# Patient Record
Sex: MALE | Race: WHITE | NOT HISPANIC OR LATINO | Employment: FULL TIME | URBAN - METROPOLITAN AREA
[De-identification: names, ages, dates, MRNs, and addresses within clinical notes are randomized per-mention and may not be internally consistent; named-entity substitution may affect disease eponyms.]

---

## 2023-06-30 PROBLEM — N52.8 OTHER MALE ERECTILE DYSFUNCTION: Status: ACTIVE | Noted: 2023-06-30

## 2023-06-30 PROBLEM — I10 HYPERTENSIVE DISORDER: Status: ACTIVE | Noted: 2019-07-19

## 2023-07-05 ENCOUNTER — TELEPHONE (OUTPATIENT)
Dept: INTERNAL MEDICINE | Age: 58
End: 2023-07-05

## 2023-07-05 NOTE — TELEPHONE ENCOUNTER
Caller: Ren De La Cruz    Relationship: Self    Best call back number: [phone]    What is the best time to reach you: ANYTIME    Who are you requesting to speak with (clinical staff, provider,  specific staff member): OFFICE STAFF    Do you know the name of the person who called: PATIENT STATED IT COULD HAVE BEEN SCOT?    What was the call regarding: PATIENT STATED THAT HE MISSED A CALL FROM THE OFFICE ABOUT A MEDICATION. HUB SAW NO MESSAGE IN CHART. PATIENT REQUESTING A CALLBACK TO KNOW WHAT IT WAS REGARDING.

## 2023-07-14 ENCOUNTER — TELEPHONE (OUTPATIENT)
Dept: PEDIATRICS | Facility: OTHER | Age: 58
End: 2023-07-14

## 2023-07-14 NOTE — TELEPHONE ENCOUNTER
Caller: Ren De La Cruz    Relationship: Self    Best call back number:     What form or medical record are you requesting: MEDICAL CLEARANCE FORM    Who is requesting this form or medical record from you: [facility] [employer]    Additional notes:     PATIENT IS NEEDING SOMEONE TO SIGN A MEDICAL FORM SAYING THAT HE TAKES HIGH BLOOD PRESSURE MEDICATION BUT IS OK TO LEARN TO DIVE      PLEASE ADVISE

## 2023-07-24 ENCOUNTER — OFFICE VISIT (OUTPATIENT)
Dept: INTERNAL MEDICINE | Age: 58
End: 2023-07-24
Payer: COMMERCIAL

## 2023-07-24 VITALS
DIASTOLIC BLOOD PRESSURE: 84 MMHG | WEIGHT: 194 LBS | HEART RATE: 60 BPM | OXYGEN SATURATION: 99 % | HEIGHT: 70 IN | SYSTOLIC BLOOD PRESSURE: 130 MMHG | BODY MASS INDEX: 27.77 KG/M2 | TEMPERATURE: 98.3 F

## 2023-07-24 DIAGNOSIS — N52.8 OTHER MALE ERECTILE DYSFUNCTION: ICD-10-CM

## 2023-07-24 DIAGNOSIS — I10 PRIMARY HYPERTENSION: ICD-10-CM

## 2023-07-24 DIAGNOSIS — Z76.89 ENCOUNTER TO ESTABLISH CARE: Primary | ICD-10-CM

## 2023-07-24 DIAGNOSIS — Z11.59 ENCOUNTER FOR HEPATITIS C SCREENING TEST FOR LOW RISK PATIENT: ICD-10-CM

## 2023-07-24 PROCEDURE — 99214 OFFICE O/P EST MOD 30 MIN: CPT

## 2023-07-24 NOTE — PROGRESS NOTES
"    I N T E R N A L  M E D I C I N E  [facility] Mendiola    ENCOUNTER DATE:  07/24/2023    Ren De La Cruz / 57 y.o. / male      CHIEF COMPLAINT / REASON FOR OFFICE VISIT     Establish Care and Hypertension      ASSESSMENT & PLAN     Diagnoses and all orders for this visit:    1. Encounter to establish care (Primary)    2. Primary hypertension  Overview:  Continue olmesartan 20 mg daily.    Orders:  -     Comprehensive Metabolic Panel    3. Other male erectile dysfunction    4. Encounter for hepatitis C screening test for low risk patient  -     Hepatitis C Antibody         SUMMARY/DISCUSSION  Will update CMP at today's visit.    Paperwork completed for pt's scuba classes.      Next Appointment with me: 1/15/2024    Return in about 6 months (around 1/13/2024) for Annual physical.      VITAL SIGNS     Visit Vitals  /84   Pulse 60   Temp 98.3 °F (36.8 °C)   Ht 177.8 cm (70\")   Wt 88 kg (194 lb)   SpO2 99%   BMI 27.84 kg/m²             Wt Readings from Last 3 Encounters:   07/24/23 88 kg (194 lb)   06/30/23 89 kg (196 lb 3.2 oz)   03/26/18 90.7 kg (200 lb)     Body mass index is 27.84 kg/m².        MEDICATIONS AT THE TIME OF OFFICE VISIT     Current Outpatient Medications on File Prior to Visit   Medication Sig Dispense Refill    olmesartan (BENICAR) 20 MG tablet Take 1 tablet by mouth Daily. 90 tablet 2    tadalafil (Cialis) 5 MG tablet Take 1 tablet by mouth Daily As Needed for Erectile Dysfunction. 30 tablet 4    [DISCONTINUED] aspirin 325 MG tablet Take 1 tablet by mouth Daily.       No current facility-administered medications on file prior to visit.        HISTORY OF PRESENT ILLNESS     Former pt of [facility] Samuel.    No problems or concerns at today's visit.  He is requesting paperwork to be completed in anticipation of taking scuba classes.    HTN: Remains on olmesartan 20 mg daily.  BP at home averages 120s/70s.  He is actively working towards weight loss with diet and exercise.  No chest pain, " shortness of breath, palpitations.      ED: Symptoms well controlled with use of tadalafil PRN.    Cologuard in February 2023 was negative.  Next due in 2026.      Patient Care Team:  Hollie De APRN as PCP - General (Family Medicine)    REVIEW OF SYSTEMS     Review of Systems   Constitutional:  Negative for chills, fever and unexpected weight change.   Respiratory:  Negative for cough, chest tightness and shortness of breath.    Cardiovascular:  Negative for chest pain, palpitations and leg swelling.   Neurological:  Negative for dizziness, weakness, light-headedness and headaches.   Psychiatric/Behavioral:  The patient is not nervous/anxious.         PHYSICAL EXAMINATION     Physical Exam  Vitals reviewed.   Constitutional:       General: He is not in acute distress.     Appearance: Normal appearance. He is not ill-appearing, toxic-appearing or diaphoretic.   HENT:      Head: Normocephalic and atraumatic.   Cardiovascular:      Rate and Rhythm: Normal rate and regular rhythm.      Heart sounds: Normal heart sounds.   Pulmonary:      Effort: Pulmonary effort is normal.      Breath sounds: Normal breath sounds.   Neurological:      Mental Status: He is alert and oriented to person, place, and time. Mental status is at baseline.   Psychiatric:         Mood and Affect: Mood normal.         Behavior: Behavior normal.         Thought Content: Thought content normal.         Judgment: Judgment normal.         REVIEWED DATA     Labs:           Imaging:            Medical Tests:           Summary of old records / correspondence / consultant report:           Request outside records:

## 2023-07-25 LAB
ALBUMIN SERPL-MCNC: 5.1 G/DL (ref 3.5–5.2)
ALBUMIN/GLOB SERPL: 3.4 G/DL
ALP SERPL-CCNC: 74 U/L (ref 39–117)
ALT SERPL-CCNC: 35 U/L (ref 1–41)
AST SERPL-CCNC: 34 U/L (ref 1–40)
BILIRUB SERPL-MCNC: 1.1 MG/DL (ref 0–1.2)
BUN SERPL-MCNC: 15 MG/DL (ref 6–20)
BUN/CREAT SERPL: 13.2 (ref 7–25)
CALCIUM SERPL-MCNC: 9.7 MG/DL (ref 8.6–10.5)
CHLORIDE SERPL-SCNC: 99 MMOL/L (ref 98–107)
CO2 SERPL-SCNC: 29.5 MMOL/L (ref 22–29)
CREAT SERPL-MCNC: 1.14 MG/DL (ref 0.76–1.27)
EGFRCR SERPLBLD CKD-EPI 2021: 75.5 ML/MIN/1.73
GLOBULIN SER CALC-MCNC: 1.5 GM/DL
GLUCOSE SERPL-MCNC: 93 MG/DL (ref 65–99)
HCV IGG SERPL QL IA: NON REACTIVE
POTASSIUM SERPL-SCNC: 4.9 MMOL/L (ref 3.5–5.2)
PROT SERPL-MCNC: 6.6 G/DL (ref 6–8.5)
SODIUM SERPL-SCNC: 139 MMOL/L (ref 136–145)

## 2024-01-18 ENCOUNTER — OFFICE VISIT (OUTPATIENT)
Dept: INTERNAL MEDICINE | Age: 59
End: 2024-01-18
Payer: COMMERCIAL

## 2024-01-18 VITALS
HEIGHT: 70 IN | BODY MASS INDEX: 29.49 KG/M2 | TEMPERATURE: 97.6 F | OXYGEN SATURATION: 97 % | WEIGHT: 206 LBS | SYSTOLIC BLOOD PRESSURE: 120 MMHG | HEART RATE: 81 BPM | DIASTOLIC BLOOD PRESSURE: 88 MMHG

## 2024-01-18 DIAGNOSIS — H60.331 ACUTE SWIMMER'S EAR OF RIGHT SIDE: ICD-10-CM

## 2024-01-18 DIAGNOSIS — I10 PRIMARY HYPERTENSION: ICD-10-CM

## 2024-01-18 DIAGNOSIS — N52.9 ERECTILE DYSFUNCTION, UNSPECIFIED ERECTILE DYSFUNCTION TYPE: ICD-10-CM

## 2024-01-18 DIAGNOSIS — Z12.5 PROSTATE CANCER SCREENING: ICD-10-CM

## 2024-01-18 DIAGNOSIS — Z00.00 ANNUAL PHYSICAL EXAM: Primary | ICD-10-CM

## 2024-01-18 LAB
ALBUMIN SERPL-MCNC: 4.6 G/DL (ref 3.5–5.2)
ALBUMIN/GLOB SERPL: 2.1 G/DL
ALP SERPL-CCNC: 78 U/L (ref 39–117)
ALT SERPL-CCNC: 57 U/L (ref 1–41)
APPEARANCE UR: CLEAR
AST SERPL-CCNC: 47 U/L (ref 1–40)
BASOPHILS # BLD AUTO: 0.03 10*3/MM3 (ref 0–0.2)
BASOPHILS NFR BLD AUTO: 0.7 % (ref 0–1.5)
BILIRUB SERPL-MCNC: 0.9 MG/DL (ref 0–1.2)
BILIRUB UR QL STRIP: NEGATIVE
BUN SERPL-MCNC: 14 MG/DL (ref 6–20)
BUN/CREAT SERPL: 12.3 (ref 7–25)
CALCIUM SERPL-MCNC: 9.6 MG/DL (ref 8.6–10.5)
CHLORIDE SERPL-SCNC: 99 MMOL/L (ref 98–107)
CHOLEST SERPL-MCNC: 202 MG/DL (ref 0–200)
CHOLEST/HDLC SERPL: 3.48 {RATIO}
CO2 SERPL-SCNC: 27.3 MMOL/L (ref 22–29)
COLOR UR: YELLOW
CREAT SERPL-MCNC: 1.14 MG/DL (ref 0.76–1.27)
EGFRCR SERPLBLD CKD-EPI 2021: 75 ML/MIN/1.73
EOSINOPHIL # BLD AUTO: 0.1 10*3/MM3 (ref 0–0.4)
EOSINOPHIL NFR BLD AUTO: 2.4 % (ref 0.3–6.2)
ERYTHROCYTE [DISTWIDTH] IN BLOOD BY AUTOMATED COUNT: 13 % (ref 12.3–15.4)
GLOBULIN SER CALC-MCNC: 2.2 GM/DL
GLUCOSE SERPL-MCNC: 73 MG/DL (ref 65–99)
GLUCOSE UR QL STRIP: NEGATIVE
HBA1C MFR BLD: 5.1 % (ref 4.8–5.6)
HCT VFR BLD AUTO: 43.7 % (ref 37.5–51)
HDLC SERPL-MCNC: 58 MG/DL (ref 40–60)
HGB BLD-MCNC: 15.6 G/DL (ref 13–17.7)
HGB UR QL STRIP: NEGATIVE
IMM GRANULOCYTES # BLD AUTO: 0.01 10*3/MM3 (ref 0–0.05)
IMM GRANULOCYTES NFR BLD AUTO: 0.2 % (ref 0–0.5)
KETONES UR QL STRIP: NEGATIVE
LDLC SERPL CALC-MCNC: 130 MG/DL (ref 0–100)
LEUKOCYTE ESTERASE UR QL STRIP: NEGATIVE
LYMPHOCYTES # BLD AUTO: 0.93 10*3/MM3 (ref 0.7–3.1)
LYMPHOCYTES NFR BLD AUTO: 22.7 % (ref 19.6–45.3)
MCH RBC QN AUTO: 36.3 PG (ref 26.6–33)
MCHC RBC AUTO-ENTMCNC: 35.7 G/DL (ref 31.5–35.7)
MCV RBC AUTO: 101.6 FL (ref 79–97)
MONOCYTES # BLD AUTO: 0.59 10*3/MM3 (ref 0.1–0.9)
MONOCYTES NFR BLD AUTO: 14.4 % (ref 5–12)
NEUTROPHILS # BLD AUTO: 2.44 10*3/MM3 (ref 1.7–7)
NEUTROPHILS NFR BLD AUTO: 59.6 % (ref 42.7–76)
NITRITE UR QL STRIP: NEGATIVE
NRBC BLD AUTO-RTO: 0 /100 WBC (ref 0–0.2)
PH UR STRIP: 5.5 [PH] (ref 5–8)
PLATELET # BLD AUTO: 174 10*3/MM3 (ref 140–450)
POTASSIUM SERPL-SCNC: 4.6 MMOL/L (ref 3.5–5.2)
PROT SERPL-MCNC: 6.8 G/DL (ref 6–8.5)
PROT UR QL STRIP: NEGATIVE
PSA SERPL-MCNC: 1.34 NG/ML (ref 0–4)
RBC # BLD AUTO: 4.3 10*6/MM3 (ref 4.14–5.8)
SODIUM SERPL-SCNC: 137 MMOL/L (ref 136–145)
SP GR UR STRIP: 1.02 (ref 1–1.03)
T4 FREE SERPL-MCNC: 1.11 NG/DL (ref 0.93–1.7)
TRIGL SERPL-MCNC: 77 MG/DL (ref 0–150)
TSH SERPL DL<=0.005 MIU/L-ACNC: 2.8 UIU/ML (ref 0.27–4.2)
UROBILINOGEN UR STRIP-MCNC: NORMAL MG/DL
VLDLC SERPL CALC-MCNC: 14 MG/DL (ref 5–40)
WBC # BLD AUTO: 4.1 10*3/MM3 (ref 3.4–10.8)

## 2024-01-18 RX ORDER — CIPROFLOXACIN AND DEXAMETHASONE 3; 1 MG/ML; MG/ML
4 SUSPENSION/ DROPS AURICULAR (OTIC) 2 TIMES DAILY
Qty: 7.5 ML | Refills: 0 | Status: SHIPPED | OUTPATIENT
Start: 2024-01-18 | End: 2024-01-25

## 2024-01-18 RX ORDER — TADALAFIL 10 MG/1
10 TABLET ORAL DAILY PRN
Qty: 30 TABLET | Refills: 0 | Status: SHIPPED | OUTPATIENT
Start: 2024-01-18

## 2024-01-18 NOTE — PROGRESS NOTES
"    I N T E R N A L  M E D I C I N E  Hollie De, APRN      ENCOUNTER DATE:  01/18/2024    Ren Jeanman / 58 y.o. / male    CHIEF COMPLAINT     Annual Exam    Reports a couple week history of right ear pain without hearing impairment, ear drainage or fever, chills.  He used leftover swimmer's ear drops this past weekend with some improvement in symptoms.  Does participate in scuba dives.      HTN: Remains well controlled on olmesartan 20 mg daily.  BP at home averages 120s/70s.  Weight has increased by by 12 pounds since July 2023.  He has plans to refocus on diet/ exercise.  No chest pain, shortness of breath, palpitations.       ED: Symptoms well controlled with use of tadalafil 10 mg PRN.     Cologuard in February 2023 was negative.  Next due in 2026.    No prior PSA results in EPIC.      VITALS     Visit Vitals  /88   Pulse 81   Temp 97.6 °F (36.4 °C)   Ht 177.8 cm (70\")   Wt 93.4 kg (206 lb)   SpO2 97%   BMI 29.56 kg/m²       BP Readings from Last 3 Encounters:   01/18/24 120/88   07/24/23 130/84   06/30/23 118/72     Wt Readings from Last 3 Encounters:   01/18/24 93.4 kg (206 lb)   07/24/23 88 kg (194 lb)   06/30/23 89 kg (196 lb 3.2 oz)      Body mass index is 29.56 kg/m².      MEDICATIONS     Current Outpatient Medications on File Prior to Visit   Medication Sig Dispense Refill    olmesartan (BENICAR) 20 MG tablet Take 1 tablet by mouth Daily. 90 tablet 2    [DISCONTINUED] tadalafil (Cialis) 5 MG tablet Take 1 tablet by mouth Daily As Needed for Erectile Dysfunction. 30 tablet 4     No current facility-administered medications on file prior to visit.         HISTORY OF PRESENT ILLNESS      Ren presents for annual health maintenance visit.    General health: good  Lifestyle:  Attempting to lose weight?: Yes   Diet: eats moderately healthy  Exercise: Walking regularly  Tobacco: Never used   Alcohol: 2 days/week and 2 drinks/occasion  Work: Full-time  Reproductive health:  Sexually active?: " Yes   Concern for STD?: No   Sexual problems?: erectile dysfunction   Sees Urologist?: No   Depression Screenin/18/2024    10:40 AM   PHQ-2/PHQ-9 Depression Screening   Little Interest or Pleasure in Doing Things 0-->not at all   Feeling Down, Depressed or Hopeless 0-->not at all   PHQ-9: Brief Depression Severity Measure Score 0         PHQ-2: 0 (Not depressed)     PHQ-9: 0 (Negative screening for depression)    Patient Care Team:  Hollie De APRN as PCP - General (Family Medicine)  ______________________________________________________________________    ALLERGIES  Allergies   Allergen Reactions    Morphine And Related Nausea And Vomiting        PFSH:     The following portions of the patient's history were reviewed and updated as appropriate: Allergies / Current Medications / Past Medical History / Surgical History / Social History / Family History    PROBLEM LIST   Patient Active Problem List   Diagnosis    Hypertensive disorder    Other male erectile dysfunction       PAST MEDICAL HISTORY  Past Medical History:   Diagnosis Date    Hypertension        SURGICAL HISTORY  Past Surgical History:   Procedure Laterality Date    HIP SURGERY Bilateral        SOCIAL HISTORY  Social History     Socioeconomic History    Marital status: Unknown   Tobacco Use    Smoking status: Never    Smokeless tobacco: Never   Vaping Use    Vaping Use: Never used   Substance and Sexual Activity    Alcohol use: Yes     Comment: daily, 1-2 drinks    Drug use: Never    Sexual activity: Yes     Partners: Female       FAMILY HISTORY  Family History   Problem Relation Age of Onset    Cancer Mother         breast    Cancer Father         lung    Diabetes Father     Hypertension Father     Multiple sclerosis Sister        IMMUNIZATION HISTORY  Immunization History   Administered Date(s) Administered    COVID-19 (MODERNA) 1st,2nd,3rd Dose Monovalent 2021, 2021    COVID-19 (MODERNA) Monovalent Original Booster  12/08/2021    Fluzone (or Fluarix & Flulaval for VFC) >6mos 09/01/2019    Influenza Seasonal Injectable 10/01/2018    Influenza, Unspecified 10/01/2018, 10/01/2023    Shingrix 08/12/2020, 02/16/2021    Tdap 08/01/2019         REVIEW OF SYSTEMS     Review of Systems   Constitutional:  Negative for chills, fever and unexpected weight change.   HENT:  Positive for ear pain. Negative for hearing loss.    Respiratory:  Negative for cough, chest tightness and shortness of breath.    Cardiovascular:  Negative for chest pain, palpitations and leg swelling.   Neurological:  Negative for dizziness, weakness, light-headedness and headaches.   Psychiatric/Behavioral:  The patient is not nervous/anxious.        PHYSICAL EXAMINATION     Physical Exam  Vitals reviewed.   Constitutional:       General: He is not in acute distress.     Appearance: Normal appearance. He is not ill-appearing, toxic-appearing or diaphoretic.   HENT:      Head: Normocephalic and atraumatic.      Right Ear: Tympanic membrane and external ear normal. There is no impacted cerumen.      Left Ear: Tympanic membrane, ear canal and external ear normal. There is no impacted cerumen.      Ears:      Comments: Right ear canal erythematous     Nose: Nose normal. No congestion or rhinorrhea.      Mouth/Throat:      Mouth: Mucous membranes are moist.      Pharynx: Oropharynx is clear. No oropharyngeal exudate or posterior oropharyngeal erythema.   Eyes:      Extraocular Movements: Extraocular movements intact.      Conjunctiva/sclera: Conjunctivae normal.      Pupils: Pupils are equal, round, and reactive to light.   Cardiovascular:      Rate and Rhythm: Normal rate and regular rhythm.      Heart sounds: Normal heart sounds.   Pulmonary:      Effort: Pulmonary effort is normal. No respiratory distress.      Breath sounds: Normal breath sounds.   Abdominal:      General: Bowel sounds are normal.      Palpations: Abdomen is soft.      Tenderness: There is no abdominal  "tenderness.   Musculoskeletal:         General: Normal range of motion.      Cervical back: Normal range of motion and neck supple.      Right lower leg: No edema.      Left lower leg: No edema.   Lymphadenopathy:      Cervical: No cervical adenopathy.   Skin:     General: Skin is warm and dry.   Neurological:      General: No focal deficit present.      Mental Status: He is alert and oriented to person, place, and time. Mental status is at baseline.   Psychiatric:         Mood and Affect: Mood normal.         Behavior: Behavior normal.         Thought Content: Thought content normal.         Judgment: Judgment normal.         REVIEWED DATA      Labs:    Lab Results   Component Value Date     07/24/2023    K 4.9 07/24/2023    CALCIUM 9.7 07/24/2023    AST 34 07/24/2023    ALT 35 07/24/2023    BUN 15 07/24/2023    CREATININE 1.14 07/24/2023       Lab Results   Component Value Date    GLUCOSE 93 07/24/2023       No results found for: \"PSA\"    [unfilled]    No results found for: \"LDL\", \"HDL\", \"TRIG\", \"CHOLHDLRATIO\"    No components found for: \"TGEU126W\"    No results found for: \"WBC\", \"HGB\", \"MCV\", \"PLT\"    No results found for: \"PROTEIN\", \"GLUCOSEU\", \"BLOODU\", \"NITRITEU\", \"LEUKOCYTESUR\"       Lab Results   Component Value Date    HEPCVIRUSABY Non Reactive 07/24/2023       Imaging:           Medical Tests:           ASSESSMENT & PLAN     ANNUAL WELLNESS EXAM / PHYSICAL     Diagnoses and all orders for this visit:    1. Annual physical exam (Primary)  -     Hemoglobin A1c  -     Lipid Panel With / Chol / HDL Ratio    2. Acute swimmer's ear of right side  -     ciprofloxacin-dexAMETHasone (Ciprodex) 0.3-0.1 % otic suspension; Administer 4 drops to the right ear 2 (Two) Times a Day for 7 days.  Dispense: 7.5 mL; Refill: 0    3. Primary hypertension  Overview:  Continue olmesartan 20 mg daily.    Orders:  -     CBC & Differential  -     Comprehensive Metabolic Panel  -     TSH+Free T4  -     Urinalysis without " microscopic (no culture) - Urine, Clean Catch    4. Erectile dysfunction, unspecified erectile dysfunction type  -     tadalafil (Cialis) 10 MG tablet; Take 1 tablet by mouth Daily As Needed for Erectile Dysfunction.  Dispense: 30 tablet; Refill: 0    5. Prostate cancer screening  -     PSA Screen         Summary/Discussion:     Return for non improving ear symptoms.    BMI is >= 25 and <30. (Overweight) The following options were offered after discussion;: exercise counseling/recommendations and nutrition counseling/recommendations      Next Appointment with me: Visit date not found    Return for 6 month chronic care, 1 year annual physical.      HEALTHCARE MAINTENANCE ISSUES       Cancer Screening:  Colon: Initial/Next screening at age: -Cologuard, current  Repeat colon cancer screening: every 3 years  Prostate: PSA checked annually but no FRANCIE needed currently  Testicular: Recommended monthly self exam  Skin: Monthly self skin examination, annual exam by health professional  Lung: Does not meet criteria for lung cancer screening.   Other:    Screening Labs & Tests:  Lab results reviewed & discussed with with patient or orders placed today.  EKG:  CV Screening: Lipid panel  DEXA (75+ or risk factors):   HEP C (If born [birth year] or risk factors): Previously had negative screen  Other:     Immunization/Vaccinations (to be given today unless deferred by patient)  Influenza: Patient had the flu shot this season  Hepatitis A: Verify immunization records  Hepatitis B: Verify immunization records  Tetanus/Pertussis: Up to date  Pneumovax/PCV: Not needed at this time  Shingles: Up to date  COVID: Does not plan to get the latest booster  Lifestyle Counseling:  Lifestyle Modifications: Attempt to lose weight, Improve dietary compliance, Begin progressive aerobic exercise program 3-5 days a week, Maintain low sodium diet (< 3 gm) for blood pressure, Make dinner the lightest meal of day, Decrease or avoid alcohol intake, and  Reduce exposure to stress if possible  Safety Issues: Always wear seatbelt, Avoid texting while driving   Use sunscreen, regular skin examination  Recommended annual dental/vision examination.  Emotional/Stress/Sleep: Reviewed and  given when appropriate      Health Maintenance   Topic Date Due    BMI FOLLOWUP  Never done    COVID-19 Vaccine (4 - 2023-24 season) 01/20/2024 (Originally 9/1/2023)    ANNUAL PHYSICAL  01/18/2025    COLORECTAL CANCER SCREENING  02/06/2026    TDAP/TD VACCINES (2 - Td or Tdap) 08/01/2029    HEPATITIS C SCREENING  Completed    INFLUENZA VACCINE  Completed    ZOSTER VACCINE  Completed    Pneumococcal Vaccine 0-64  Aged Out

## 2024-01-19 DIAGNOSIS — D75.89 MACROCYTOSIS WITHOUT ANEMIA: Primary | ICD-10-CM

## 2024-02-06 RX ORDER — OLMESARTAN MEDOXOMIL 20 MG/1
20 TABLET ORAL DAILY
Qty: 90 TABLET | Refills: 1 | Status: SHIPPED | OUTPATIENT
Start: 2024-02-06

## 2024-02-23 DIAGNOSIS — H60.331 ACUTE SWIMMER'S EAR OF RIGHT SIDE: ICD-10-CM

## 2024-02-23 RX ORDER — CIPROFLOXACIN AND DEXAMETHASONE 3; 1 MG/ML; MG/ML
4 SUSPENSION/ DROPS AURICULAR (OTIC) 2 TIMES DAILY
Qty: 7.5 ML | Refills: 0 | OUTPATIENT
Start: 2024-02-23 | End: 2024-03-01

## 2024-02-23 NOTE — TELEPHONE ENCOUNTER
Caller: Ren De La Cruz    Relationship: Self    Best call back number:     [phone] (Home)       Requested Prescriptions:   Requested Prescriptions     Pending Prescriptions Disp Refills    ciprofloxacin-dexAMETHasone (Ciprodex) 0.3-0.1 % otic suspension 7.5 mL 0     Sig: Administer 4 drops to the right ear 2 (Two) Times a Day for 7 days.        Pharm[facility] where request should be sent[facility] [place] [facility] - [address] AT [address] TO [facility] - [phone]  - [phone] FX     Last office visit with prescribing clinician: 1/18/2024   Last telemedicine visit with prescribing clinician: Visit date not found   Next office visit with prescribing clinician: 7/18/2024     Additional details provided by patient: LESS THAN 3 DAYS     Does the patient have less than a 3 day supply:  [] Yes  [x] No    Would you like a call back once the refill request has been completed: [] Yes [x] No    If the office needs to give you a call back, can they leave a voicemail: [] Yes [x] No    Pantera Alvarez Rep   02/23/24 10:25 EST

## 2024-02-29 ENCOUNTER — OFFICE VISIT (OUTPATIENT)
Dept: INTERNAL MEDICINE | Age: 59
End: 2024-02-29
Payer: COMMERCIAL

## 2024-02-29 VITALS
HEIGHT: 70 IN | DIASTOLIC BLOOD PRESSURE: 82 MMHG | WEIGHT: 212 LBS | BODY MASS INDEX: 30.35 KG/M2 | OXYGEN SATURATION: 97 % | SYSTOLIC BLOOD PRESSURE: 130 MMHG | HEART RATE: 95 BPM | TEMPERATURE: 97.3 F

## 2024-02-29 DIAGNOSIS — H65.191 ACUTE EFFUSION OF RIGHT EAR: Primary | ICD-10-CM

## 2024-02-29 PROCEDURE — 99213 OFFICE O/P EST LOW 20 MIN: CPT

## 2024-02-29 RX ORDER — METHYLPREDNISOLONE 4 MG/1
TABLET ORAL
Qty: 21 TABLET | Refills: 0 | Status: SHIPPED | OUTPATIENT
Start: 2024-02-29

## 2024-02-29 RX ORDER — FLUTICASONE PROPIONATE 50 MCG
2 SPRAY, SUSPENSION (ML) NASAL DAILY
Qty: 16 G | Refills: 0 | Status: SHIPPED | OUTPATIENT
Start: 2024-02-29

## 2024-02-29 NOTE — PROGRESS NOTES
"    I N T E R N A L  M E D I C I N E  Hollie De, APRATILIO    ENCOUNTER DATE:  02/29/2024    Ren De La Cruz / 58 y.o. / male      CHIEF COMPLAINT / REASON FOR OFFICE VISIT     Earache (Right ears few weeks)      ASSESSMENT & PLAN     Diagnoses and all orders for this visit:    1. Acute effusion of right ear (Primary)  -     fluticasone (FLONASE) 50 MCG/ACT nasal spray; 2 sprays into the nostril(s) as directed by provider Daily.  Dispense: 16 g; Refill: 0  -     methylPREDNISolone (MEDROL) 4 MG dose pack; Take as directed on package instructions.  Dispense: 21 tablet; Refill: 0         SUMMARY/DISCUSSION  Treat symptomatic right ear effusion with medrol dose pack, Flonase nasal spray.  He is agreeable to provide an update on his condition next week through phone call.  If symptoms have not resolved, will place referral to ENT.      Next Appointment with me: 7/18/2024    Return for Next scheduled follow up.      VITAL SIGNS     Visit Vitals  /82   Pulse 95   Temp 97.3 °F (36.3 °C)   Ht 177.8 cm (70\")   Wt 96.2 kg (212 lb)   SpO2 97%   BMI 30.42 kg/m²             Wt Readings from Last 3 Encounters:   02/29/24 96.2 kg (212 lb)   01/18/24 93.4 kg (206 lb)   07/24/23 88 kg (194 lb)     Body mass index is 30.42 kg/m².        MEDICATIONS AT THE TIME OF OFFICE VISIT     Current Outpatient Medications on File Prior to Visit   Medication Sig Dispense Refill    olmesartan (BENICAR) 20 MG tablet Take 1 tablet by mouth Daily. 90 tablet 1    tadalafil (Cialis) 10 MG tablet Take 1 tablet by mouth Daily As Needed for Erectile Dysfunction. 30 tablet 0     No current facility-administered medications on file prior to visit.        HISTORY OF PRESENT ILLNESS     Last seen in our office on January 18, 2024.  At that time, he reported a couple week history of right ear pain without hearing impairment, ear drainage or fever, chills.  He used leftover swimmer's ear drops the prior weekend with some improvement in symptoms.  Does " "participate in scuba dives.      In the last couple weeks, he experienced recurrent right ear pressure/ discomfort, and again used leftover Ciprodex drops.  Symptoms have improved some but his right ear remains sensitive to loud sounds, and he continues to experience right ear discomfort described as \"throbbing.\"  Has not been engaging in any diving as of late.  No fever, chills, sinus congestion, pressure, ear drainage.      Patient Care Team:  Hollie De APRN as PCP - General (Family Medicine)    REVIEW OF SYSTEMS     Review of Systems   Constitutional:  Negative for chills, fever and unexpected weight change.   HENT:  Positive for ear pain (Right). Negative for congestion, hearing loss, sinus pressure and sinus pain.    Respiratory:  Negative for cough, chest tightness and shortness of breath.    Cardiovascular:  Negative for chest pain, palpitations and leg swelling.   Neurological:  Negative for dizziness, weakness, light-headedness and headaches.   Psychiatric/Behavioral:  The patient is not nervous/anxious.           PHYSICAL EXAMINATION     Physical Exam  Vitals reviewed.   Constitutional:       General: He is not in acute distress.     Appearance: Normal appearance. He is not ill-appearing, toxic-appearing or diaphoretic.   HENT:      Head: Normocephalic and atraumatic.      Right Ear: Tympanic membrane, ear canal and external ear normal. A middle ear effusion is present.      Left Ear: Tympanic membrane, ear canal and external ear normal. There is no impacted cerumen.      Nose:      Right Sinus: No maxillary sinus tenderness or frontal sinus tenderness.      Left Sinus: No maxillary sinus tenderness or frontal sinus tenderness.   Cardiovascular:      Rate and Rhythm: Normal rate and regular rhythm.      Heart sounds: Normal heart sounds.   Pulmonary:      Effort: Pulmonary effort is normal.      Breath sounds: Normal breath sounds.   Lymphadenopathy:      Cervical: No cervical adenopathy. "   Neurological:      Mental Status: He is alert and oriented to person, place, and time. Mental status is at baseline.   Psychiatric:         Mood and Affect: Mood normal.         Behavior: Behavior normal.         Thought Content: Thought content normal.         Judgment: Judgment normal.           REVIEWED DATA     Labs:           Imaging:            Medical Tests:           Summary of old records / correspondence / consultant report:           Request outside records:

## 2024-07-18 ENCOUNTER — OFFICE VISIT (OUTPATIENT)
Dept: INTERNAL MEDICINE | Age: 59
End: 2024-07-18
Payer: COMMERCIAL

## 2024-07-18 VITALS
RESPIRATION RATE: 16 BRPM | SYSTOLIC BLOOD PRESSURE: 145 MMHG | HEART RATE: 100 BPM | OXYGEN SATURATION: 96 % | WEIGHT: 208 LBS | BODY MASS INDEX: 29.78 KG/M2 | HEIGHT: 70 IN | TEMPERATURE: 98.2 F | DIASTOLIC BLOOD PRESSURE: 88 MMHG

## 2024-07-18 DIAGNOSIS — E78.00 PURE HYPERCHOLESTEROLEMIA: ICD-10-CM

## 2024-07-18 DIAGNOSIS — D75.89 MACROCYTOSIS WITHOUT ANEMIA: ICD-10-CM

## 2024-07-18 DIAGNOSIS — I10 PRIMARY HYPERTENSION: Primary | ICD-10-CM

## 2024-07-18 PROCEDURE — 99214 OFFICE O/P EST MOD 30 MIN: CPT

## 2024-07-18 NOTE — PROGRESS NOTES
"    I N T E R N A L  M E D I C I N E  Hollie De, APRN    ENCOUNTER DATE:  07/18/2024    Ren De La Cruz / 58 y.o. / male      CHIEF COMPLAINT / REASON FOR OFFICE VISIT     Hypertension      ASSESSMENT & PLAN     Diagnoses and all orders for this visit:    1. Primary hypertension (Primary)  Overview:  Continue olmesartan 20 mg daily.      2. Pure hypercholesterolemia    3. Macrocytosis without anemia         SUMMARY/DISCUSSION  Not fasting at today's visit, so will schedule fasting lab only appointment next week.  At that time, he will bring in record of daily BP readings at home.  BP is elevated at today's visit.  Discussed with patient that if BP continues to remain elevated > 130/80, recommend increasing olmesartan dosing from 20 mg to 40 mg daily with recheck of BMP in 1 month.  Encouraged low sodium diet, regular exercise, reduce stress.  For HLD, encouraged continued weight loss, low fat diet.    Suspect macrocytosis is related to alcohol intake.  Encouraged patient to decrease alcohol use.        Next Appointment with me: Visit date not found    No follow-ups on file.      VITAL SIGNS     Visit Vitals  /88   Pulse 100   Temp 98.2 °F (36.8 °C)   Resp 16   Ht 177.8 cm (70\")   Wt 94.3 kg (208 lb)   SpO2 96%   BMI 29.84 kg/m²             Wt Readings from Last 3 Encounters:   07/18/24 94.3 kg (208 lb)   02/29/24 96.2 kg (212 lb)   01/18/24 93.4 kg (206 lb)     Body mass index is 29.84 kg/m².        MEDICATIONS AT THE TIME OF OFFICE VISIT     Current Outpatient Medications on File Prior to Visit   Medication Sig Dispense Refill    olmesartan (BENICAR) 20 MG tablet Take 1 tablet by mouth Daily. 90 tablet 1    tadalafil (Cialis) 10 MG tablet Take 1 tablet by mouth Daily As Needed for Erectile Dysfunction. 30 tablet 0    [DISCONTINUED] fluticasone (FLONASE) 50 MCG/ACT nasal spray 2 sprays into the nostril(s) as directed by provider Daily. (Patient not taking: Reported on 7/18/2024) 16 g 0    [DISCONTINUED] " methylPREDNISolone (MEDROL) 4 MG dose pack Take as directed on package instructions. (Patient not taking: Reported on 7/18/2024) 21 tablet 0     No current facility-administered medications on file prior to visit.        HISTORY OF PRESENT ILLNESS     HTN: BP is elevated at today's visit,  145/88, on olmesartan 20 mg daily.  BP at home is averaging 140/80 but checking only occasionally.  Weight is down 4 pounds since February 2024.   No chest pain, shortness of breath, palpitations.     HLD: January 2024 lipid panel with elevated ; normal triglycerides 77.  Not taking a statin.   No family history of CAD.      January 2024 CBC with macrocytosis without anemia, mild elevation of monocytes at 14%.  Drinks a couple bourbon drinks daily.       ED: Symptoms well controlled with use of tadalafil 10 mg PRN.     Cologuard in February 2023 was negative.  Next due in 2026.     January 2024 PSA 1.340.      Patient Care Team:  Hollie eD APRN as PCP - General (Family Medicine)    REVIEW OF SYSTEMS     Review of Systems   Constitutional:  Negative for chills, fever and unexpected weight change.   Respiratory:  Negative for cough, chest tightness and shortness of breath.    Cardiovascular:  Negative for chest pain, palpitations and leg swelling.   Neurological:  Negative for dizziness, weakness, light-headedness and headaches.   Psychiatric/Behavioral:  The patient is not nervous/anxious.           PHYSICAL EXAMINATION     Physical Exam  Vitals reviewed.   Constitutional:       General: He is not in acute distress.     Appearance: Normal appearance. He is not ill-appearing, toxic-appearing or diaphoretic.   HENT:      Head: Normocephalic and atraumatic.   Cardiovascular:      Rate and Rhythm: Normal rate and regular rhythm.      Heart sounds: Normal heart sounds.   Pulmonary:      Effort: Pulmonary effort is normal.      Breath sounds: Normal breath sounds.   Musculoskeletal:      Right lower leg: No edema.      Left  lower leg: No edema.   Neurological:      Mental Status: He is alert and oriented to person, place, and time. Mental status is at baseline.   Psychiatric:         Mood and Affect: Mood normal.         Behavior: Behavior normal.         Thought Content: Thought content normal.         Judgment: Judgment normal.           REVIEWED DATA     Labs:           Imaging:            Medical Tests:           Summary of old records / correspondence / consultant report:           Request outside records:

## 2024-07-26 LAB
ALBUMIN SERPL-MCNC: 4.5 G/DL (ref 3.5–5.2)
ALBUMIN/GLOB SERPL: 2.3 G/DL
ALP SERPL-CCNC: 76 U/L (ref 39–117)
ALT SERPL-CCNC: 24 U/L (ref 1–41)
AST SERPL-CCNC: 24 U/L (ref 1–40)
BASOPHILS # BLD AUTO: 0.04 10*3/MM3 (ref 0–0.2)
BASOPHILS NFR BLD AUTO: 1.1 % (ref 0–1.5)
BILIRUB SERPL-MCNC: 1.3 MG/DL (ref 0–1.2)
BUN SERPL-MCNC: 18 MG/DL (ref 6–20)
BUN/CREAT SERPL: 19.6 (ref 7–25)
CALCIUM SERPL-MCNC: 9.2 MG/DL (ref 8.6–10.5)
CHLORIDE SERPL-SCNC: 96 MMOL/L (ref 98–107)
CHOLEST SERPL-MCNC: 171 MG/DL (ref 0–200)
CHOLEST/HDLC SERPL: 3.23 {RATIO}
CO2 SERPL-SCNC: 25.1 MMOL/L (ref 22–29)
CREAT SERPL-MCNC: 0.92 MG/DL (ref 0.76–1.27)
EGFRCR SERPLBLD CKD-EPI 2021: 97 ML/MIN/1.73
EOSINOPHIL # BLD AUTO: 0.12 10*3/MM3 (ref 0–0.4)
EOSINOPHIL NFR BLD AUTO: 3.3 % (ref 0.3–6.2)
ERYTHROCYTE [DISTWIDTH] IN BLOOD BY AUTOMATED COUNT: 12.4 % (ref 12.3–15.4)
FOLATE SERPL-MCNC: 3.28 NG/ML (ref 4.78–24.2)
GLOBULIN SER CALC-MCNC: 2 GM/DL
GLUCOSE SERPL-MCNC: 94 MG/DL (ref 65–99)
HCT VFR BLD AUTO: 45.3 % (ref 37.5–51)
HDLC SERPL-MCNC: 53 MG/DL (ref 40–60)
HGB BLD-MCNC: 15.8 G/DL (ref 13–17.7)
IMM GRANULOCYTES # BLD AUTO: 0.01 10*3/MM3 (ref 0–0.05)
IMM GRANULOCYTES NFR BLD AUTO: 0.3 % (ref 0–0.5)
LDLC SERPL CALC-MCNC: 106 MG/DL (ref 0–100)
LYMPHOCYTES # BLD AUTO: 0.95 10*3/MM3 (ref 0.7–3.1)
LYMPHOCYTES NFR BLD AUTO: 26.2 % (ref 19.6–45.3)
MCH RBC QN AUTO: 35.2 PG (ref 26.6–33)
MCHC RBC AUTO-ENTMCNC: 34.9 G/DL (ref 31.5–35.7)
MCV RBC AUTO: 100.9 FL (ref 79–97)
MONOCYTES # BLD AUTO: 0.55 10*3/MM3 (ref 0.1–0.9)
MONOCYTES NFR BLD AUTO: 15.2 % (ref 5–12)
NEUTROPHILS # BLD AUTO: 1.96 10*3/MM3 (ref 1.7–7)
NEUTROPHILS NFR BLD AUTO: 53.9 % (ref 42.7–76)
NRBC BLD AUTO-RTO: 0 /100 WBC (ref 0–0.2)
PLATELET # BLD AUTO: 207 10*3/MM3 (ref 140–450)
POTASSIUM SERPL-SCNC: 4.7 MMOL/L (ref 3.5–5.2)
PROT SERPL-MCNC: 6.5 G/DL (ref 6–8.5)
RBC # BLD AUTO: 4.49 10*6/MM3 (ref 4.14–5.8)
SODIUM SERPL-SCNC: 132 MMOL/L (ref 136–145)
TRIGL SERPL-MCNC: 59 MG/DL (ref 0–150)
VIT B12 SERPL-MCNC: >2000 PG/ML (ref 211–946)
VLDLC SERPL CALC-MCNC: 12 MG/DL (ref 5–40)
WBC # BLD AUTO: 3.63 10*3/MM3 (ref 3.4–10.8)

## 2024-09-11 DIAGNOSIS — N52.9 ERECTILE DYSFUNCTION, UNSPECIFIED ERECTILE DYSFUNCTION TYPE: ICD-10-CM

## 2024-09-11 RX ORDER — OLMESARTAN MEDOXOMIL 20 MG/1
20 TABLET ORAL DAILY
Qty: 90 TABLET | Refills: 1 | Status: SHIPPED | OUTPATIENT
Start: 2024-09-11

## 2024-09-11 RX ORDER — TADALAFIL 10 MG/1
10 TABLET ORAL DAILY PRN
Qty: 30 TABLET | Refills: 0 | Status: SHIPPED | OUTPATIENT
Start: 2024-09-11

## 2024-09-11 NOTE — TELEPHONE ENCOUNTER
Caller: Ren De La Cruz    Relationship: Self    Best call back number: [phone]    Requested Prescriptions:   Requested Prescriptions     Pending Prescriptions Disp Refills    olmesartan (BENICAR) 20 MG tablet 90 tablet 1     Sig: Take 1 tablet by mouth Daily.    tadalafil (Cialis) 10 MG tablet 30 tablet 0     Sig: Take 1 tablet by mouth Daily As Needed for Erectile Dysfunction.        Pharmacy where request should be sent: [facility] [address] - [address] [facility] AT [address]AL TO [facility] - [phone]  - [phone] FX     Last office visit with prescribing clinician: 7/18/2024   Last telemedicine visit with prescribing clinician: Visit date not found   Next office visit with prescribing clinician: 11/21/2024     Additional details provided by patient:     Does the patient have less than a 3 day supply:  [] Yes  [x] No        Pantera Infante Rep   09/11/24 12:26 EDT

## 2024-09-27 ENCOUNTER — TELEPHONE (OUTPATIENT)
Dept: INTERNAL MEDICINE | Age: 59
End: 2024-09-27
Payer: COMMERCIAL

## 2024-09-30 ENCOUNTER — OFFICE VISIT (OUTPATIENT)
Dept: INTERNAL MEDICINE | Age: 59
End: 2024-09-30
Payer: COMMERCIAL

## 2024-09-30 VITALS
WEIGHT: 209 LBS | OXYGEN SATURATION: 97 % | BODY MASS INDEX: 29.92 KG/M2 | TEMPERATURE: 96 F | HEART RATE: 66 BPM | DIASTOLIC BLOOD PRESSURE: 84 MMHG | RESPIRATION RATE: 18 BRPM | HEIGHT: 70 IN | SYSTOLIC BLOOD PRESSURE: 124 MMHG

## 2024-09-30 DIAGNOSIS — H66.90 ACUTE OTITIS MEDIA, UNSPECIFIED OTITIS MEDIA TYPE: Primary | ICD-10-CM

## 2024-09-30 DIAGNOSIS — H61.20 IMPACTED CERUMEN, UNSPECIFIED LATERALITY: ICD-10-CM

## 2024-09-30 PROCEDURE — 69209 REMOVE IMPACTED EAR WAX UNI: CPT | Performed by: PHYSICIAN ASSISTANT

## 2024-09-30 PROCEDURE — 99213 OFFICE O/P EST LOW 20 MIN: CPT | Performed by: PHYSICIAN ASSISTANT

## 2024-09-30 RX ORDER — CIPROFLOXACIN AND DEXAMETHASONE 3; 1 MG/ML; MG/ML
4 SUSPENSION/ DROPS AURICULAR (OTIC) 2 TIMES DAILY
Qty: 7.5 ML | Refills: 0 | Status: SHIPPED | OUTPATIENT
Start: 2024-09-30

## 2024-09-30 NOTE — PROGRESS NOTES
"    I N T E R N A L  M E D I C I N E    SLADE HORNER PA-C      ENCOUNTER DATE:  09/30/2024    Ren De La Cruz / 59 y.o. / male    CHIEF COMPLAINT / REASON FOR OFFICE VISIT     Earache (Right ear pain-comes and go; pt was seen last year about this and it was infection and antibiotic. A lot ringing, pressure and build up. No ear drops )      ASSESSMENT & PLAN     1. Acute otitis media, unspecified otitis media type    2. Impacted cerumen, unspecified laterality      Orders Placed This Encounter   Procedures    Ear Cerumen Removal     New Medications Ordered This Visit   Medications    ciprofloxacin-dexAMETHasone (Ciprodex) 0.3-0.1 % otic suspension     Sig: Administer 4 drops to the right ear 2 (Two) Times a Day. For 5 days     Dispense:  7.5 mL     Refill:  0       SUMMARY/DISCUSSION  Impacted cerumen/drainage, right ear: Irrigation of right ear with instrumentation completed after use of ceruminolytic.  Patient tolerated well without complication.  Otitis media: Ciprodex otic suspension to be managed her to right ear as instructed.  Follow-up if needed.      Next Appointment:   Return if symptoms worsen or fail to improve. And, for Follow-up with CLARK Mendiola.        VITAL SIGNS     Vitals:    09/30/24 1553   BP: 124/84   BP Location: Left arm   Patient Position: Sitting   Cuff Size: Adult   Pulse: 66   Resp: 18   Temp: 96 °F (35.6 °C)   TempSrc: Temporal   SpO2: 97%   Weight: 94.8 kg (209 lb)   Height: 177.8 cm (70\")       BP Readings from Last 3 Encounters:   09/30/24 124/84   07/18/24 145/88   02/29/24 130/82     Wt Readings from Last 3 Encounters:   09/30/24 94.8 kg (209 lb)   07/18/24 94.3 kg (208 lb)   02/29/24 96.2 kg (212 lb)     Body mass index is 29.99 kg/m².         No data to display                 MEDICATIONS AT THE TIME OF OFFICE VISIT     Current Outpatient Medications on File Prior to Visit   Medication Sig    olmesartan (BENICAR) 20 MG tablet Take 1 tablet by mouth Daily.    " tadalafil (Cialis) 10 MG tablet Take 1 tablet by mouth Daily As Needed for Erectile Dysfunction.     No current facility-administered medications on file prior to visit.          HISTORY OF PRESENT ILLNESS     Pt is a 60y/o wm with hypertension and prior right ear infections who presents with complaint of right ear pain.     He reports awaking with right inner-ear irritation, pressure, and ringing that began without warning on about 3 weeks ago. The right ear discomfort is improved with use of an ear-plug, but nothing exacerbates the symptoms. He admits having similar symptoms in January of 2024, and was treated successfully with Ciprodex otic suspension. He denies hearing impairment, sharp inner-ear pain, drainage from the right ear, recent scuba diving or swimming, fever, or chills.         REVIEW OF SYSTEMS     Review of Systems   All other systems reviewed and are negative.     As Per HPI.      PHYSICAL EXAMINATION     Physical Exam  Vitals and nursing note reviewed.   Constitutional:       General: He is not in acute distress.     Appearance: Normal appearance. He is not ill-appearing.   HENT:      Right Ear: External ear normal. Drainage present. There is impacted cerumen (40% cerumen/drainage blockage to right ear.). Tympanic membrane is erythematous.      Left Ear: Tympanic membrane, ear canal and external ear normal. There is no impacted cerumen.   Cardiovascular:      Rate and Rhythm: Normal rate and regular rhythm.      Pulses: Normal pulses.      Heart sounds: Normal heart sounds. No murmur heard.     No friction rub. No gallop.   Pulmonary:      Effort: Pulmonary effort is normal. No respiratory distress.      Breath sounds: Normal breath sounds. No stridor. No wheezing.   Neurological:      Mental Status: He is alert.   Psychiatric:         Mood and Affect: Mood normal.         Behavior: Behavior normal.             REVIEWED DATA     Labs:     Lab Results   Component Value Date    CHLPL 171 07/26/2024  "   TRIG 59 07/26/2024    HDL 53 07/26/2024     (H) 07/26/2024    VLDL 12 07/26/2024           Imaging:           Medical Tests:     Ear Cerumen Removal    Date/Time: 9/30/2024 4:20 PM    Performed by: Ronal Hoff PA-C  Authorized by: Ronal Hoff PA-C  Consent: Verbal consent obtained.  Risks and benefits: risks, benefits and alternatives were discussed  Consent given by: patient  Patient understanding: patient states understanding of the procedure being performed  Patient consent: the patient's understanding of the procedure matches consent given  Procedure consent: procedure consent matches procedure scheduled  Relevant documents: relevant documents present and verified  Imaging studies: imaging studies not available  Patient identity confirmed: verbally with patient  Time out: Immediately prior to procedure a \"time out\" was called to verify the correct patient, procedure, equipment, support staff and site/side marked as required.    Anesthesia:  Local Anesthetic: none  Ceruminolytics applied: Ceruminolytics applied prior to the procedure.  Location details: right ear  Patient tolerance: patient tolerated the procedure well with no immediate complications  Procedure type: irrigation   Sedation:  Patient sedated: no               Summary of old records / correspondence / consultant report:           Request outside records:         "

## 2024-10-02 ENCOUNTER — PRIOR AUTHORIZATION (OUTPATIENT)
Dept: INTERNAL MEDICINE | Age: 59
End: 2024-10-02
Payer: COMMERCIAL

## 2024-10-02 NOTE — TELEPHONE ENCOUNTER
Tadalafil 10MG tablets        Ldvm for pt. This medication is not covered under insurance for ED. Pt can use [facility].

## 2024-10-08 DIAGNOSIS — N52.9 ERECTILE DYSFUNCTION, UNSPECIFIED ERECTILE DYSFUNCTION TYPE: ICD-10-CM

## 2024-10-08 RX ORDER — TADALAFIL 10 MG/1
10 TABLET ORAL DAILY PRN
Qty: 30 TABLET | Refills: 0 | Status: SHIPPED | OUTPATIENT
Start: 2024-10-08

## 2024-10-08 NOTE — TELEPHONE ENCOUNTER
Caller: Ren De La Cruz    Relationship: Self    Best call back number: [phone]     Requested Prescriptions:   Requested Prescriptions     Pending Prescriptions Disp Refills    tadalafil (Cialis) 10 MG tablet 30 tablet 0     Sig: Take 1 tablet by mouth Daily As Needed for Erectile Dysfunction.        Pharmacy where request should be sent: [id number] - [place], [place] - [address] AT [address] [facility] - [phone] [facility] [phone] FX     Last office visit with prescribing clinician: 7/18/2024   Last telemedicine visit with prescribing clinician: Visit date not found   Next office visit with prescribing clinician: 11/21/2024     Additional details provided by patient: NEEDS NEW PRESCRIPTION SENT TO PHARMACY, PATIENT IS AWARE INSURANCE DOES NOT COVER AND WILL BE USING [facility]RX,.    Does the patient have less than a 3 day supply:  [] Yes  [x] No    Would you like a call back once the refill request has been completed: [] Yes [x] No    If the office needs to give you a call back, can they leave a voicemail: [x] Yes [] No    Pantera Bess Rep   10/08/24 12:51 EDT

## 2024-10-28 DIAGNOSIS — H66.90 ACUTE OTITIS MEDIA, UNSPECIFIED OTITIS MEDIA TYPE: ICD-10-CM

## 2024-10-28 NOTE — TELEPHONE ENCOUNTER
Caller: Ren De La Cruz    Relationship: Self    Best call back number: [phone]    Requested Prescriptions:   Requested Prescriptions     Pending Prescriptions Disp Refills    ciprofloxacin-dexAMETHasone (Ciprodex) 0.3-0.1 % otic suspension 7.5 mL 0     Sig: Administer 4 drops to the right ear 2 (Two) Times a Day. For 5 days        Pharmacy where request should be sent: [facility] #[id number] - [place], [place] - [address]  IN [facility] [phone] [facility] [phone] FX     Last office visit with prescribing clinician: 7/18/2024   Last telemedicine visit with prescribing clinician: Visit date not found   Next office visit with prescribing clinician: 11/21/2024     Additional details provided by patient: RIGHT EAR HAS STARTED TO BOTHER HIM AGAIN.     Does the patient have less than a 3 day supply:  [x] Yes  [] No        Pantera Infante Rep   10/28/24 13:10 EDT

## 2024-10-29 RX ORDER — CIPROFLOXACIN AND DEXAMETHASONE 3; 1 MG/ML; MG/ML
4 SUSPENSION/ DROPS AURICULAR (OTIC) 2 TIMES DAILY
Qty: 7.5 ML | Refills: 0 | OUTPATIENT
Start: 2024-10-29

## 2024-10-31 ENCOUNTER — OFFICE VISIT (OUTPATIENT)
Dept: INTERNAL MEDICINE | Age: 59
End: 2024-10-31
Payer: COMMERCIAL

## 2024-10-31 VITALS
SYSTOLIC BLOOD PRESSURE: 130 MMHG | WEIGHT: 213 LBS | OXYGEN SATURATION: 95 % | RESPIRATION RATE: 17 BRPM | DIASTOLIC BLOOD PRESSURE: 89 MMHG | HEART RATE: 85 BPM | TEMPERATURE: 96.9 F | BODY MASS INDEX: 30.49 KG/M2 | HEIGHT: 70 IN

## 2024-10-31 DIAGNOSIS — Z91.09 ENVIRONMENTAL ALLERGIES: ICD-10-CM

## 2024-10-31 DIAGNOSIS — H93.11 TINNITUS OF RIGHT EAR: Primary | ICD-10-CM

## 2024-10-31 PROCEDURE — 99213 OFFICE O/P EST LOW 20 MIN: CPT | Performed by: PHYSICIAN ASSISTANT

## 2024-10-31 RX ORDER — CETIRIZINE HYDROCHLORIDE, PSEUDOEPHEDRINE HYDROCHLORIDE 5; 120 MG/1; MG/1
1 TABLET, FILM COATED, EXTENDED RELEASE ORAL 2 TIMES DAILY
Qty: 30 TABLET | Refills: 0 | Status: SHIPPED | OUTPATIENT
Start: 2024-10-31

## 2024-10-31 RX ORDER — CETIRIZINE HYDROCHLORIDE, PSEUDOEPHEDRINE HYDROCHLORIDE 5; 120 MG/1; MG/1
1 TABLET, FILM COATED, EXTENDED RELEASE ORAL 2 TIMES DAILY
Qty: 30 TABLET | Refills: 0 | Status: SHIPPED | OUTPATIENT
Start: 2024-10-31 | End: 2024-10-31

## 2024-10-31 RX ORDER — FLUTICASONE PROPIONATE 50 MCG
2 SPRAY, SUSPENSION (ML) NASAL DAILY
Qty: 15.8 ML | Refills: 0 | Status: SHIPPED | OUTPATIENT
Start: 2024-10-31

## 2024-10-31 RX ORDER — FLUTICASONE PROPIONATE 50 MCG
2 SPRAY, SUSPENSION (ML) NASAL DAILY
Qty: 15.8 ML | Refills: 0 | Status: SHIPPED | OUTPATIENT
Start: 2024-10-31 | End: 2024-10-31

## 2024-10-31 NOTE — PROGRESS NOTES
SLADE HORNER PA-C                  I  N  T  E  R  N  A  L    M  E  D  I  C  I  N  E         ENCOUNTER DATE:  10/31/2024    Ren De La Cruz / 59 y.o. / male    OFFICE VISIT ENCOUNTER       CHIEF COMPLAINT / REASON FOR OFFICE VISIT     Tinnitus (Right ear follow up-still ringing and medication of drops were helping; needs more. )      ASSESSMENT & PLAN     Problem List Items Addressed This Visit    None  Visit Diagnoses       Tinnitus of right ear    -  Primary    Relevant Orders    Ambulatory Referral to ENT (Otolaryngology)    Environmental allergies        Relevant Medications    cetirizine-pseudoephedrine (ZyrTEC-D) 5-120 MG per 12 hr tablet    fluticasone (FLONASE) 50 MCG/ACT nasal spray          Orders Placed This Encounter   Procedures   • Ambulatory Referral to ENT (Otolaryngology)     Referral Priority:   Routine     Referral Type:   Consultation     Referral Reason:   Specialty Services Required     Requested Specialty:   Otolaryngology     Number of Visits Requested:   1     New Medications Ordered This Visit   Medications   • cetirizine-pseudoephedrine (ZyrTEC-D) 5-120 MG per 12 hr tablet     Sig: Take 1 tablet by mouth 2 (Two) Times a Day.     Dispense:  30 tablet     Refill:  0   • fluticasone (FLONASE) 50 MCG/ACT nasal spray     Sig: Administer 2 sprays into the nostril(s) as directed by provider Daily.     Dispense:  15.8 mL     Refill:  0       SUMMARY/DISCUSSION  Start decongestive therapy to include Zyrtec-D and fluticasone nasal spray  Ambulatory referral to ENT to evaluate for hearing loss.        Next Appointment:   Return if symptoms worsen or fail to improve. And, for Follow-up with CLARK Mendiola in November.      VITAL SIGNS     Vitals:    10/31/24 1609   BP: 130/89   BP Location: Left arm   Patient Position: Sitting   Cuff Size: Adult   Pulse: 85   Resp: 17   Temp: 96.9 °F (36.1 °C)   TempSrc: Temporal   SpO2: 95%   Weight: 96.6 kg (213 lb)   Height:  "177.8 cm (70\")       BP Readings from Last 3 Encounters:   10/31/24 130/89   09/30/24 124/84   07/18/24 145/88     Wt Readings from Last 3 Encounters:   10/31/24 96.6 kg (213 lb)   09/30/24 94.8 kg (209 lb)   07/18/24 94.3 kg (208 lb)     Body mass index is 30.56 kg/m².         No data to display                   MEDICATIONS AT THE TIME OF OFFICE VISIT     Current Outpatient Medications on File Prior to Visit   Medication Sig   • ciprofloxacin-dexAMETHasone (Ciprodex) 0.3-0.1 % otic suspension Administer 4 drops to the right ear 2 (Two) Times a Day. For 5 days   • olmesartan (BENICAR) 20 MG tablet Take 1 tablet by mouth Daily.   • tadalafil (Cialis) 10 MG tablet Take 1 tablet by mouth Daily As Needed for Erectile Dysfunction.     No current facility-administered medications on file prior to visit.          HISTORY OF PRESENT ILLNESS     Pt is a 58y/o wm with hypertension and prior right ear infections who presents with complaint of a return of right ear tinnitus.     He was previously seen in office on 9/30/2024 where he successfully underwent right ear irrigation and prescribed Ciprodex to treat a case of Otitis.  In the days initially following treatment he believed that his symptoms of buzzing to the right ear have completely improved.  On 3 days ago he began to notice the low-level buzzing to his right ear once again. He now reports a constant, low-level buzzing or \"humming\" to the right ear. The sound/sensation is worsened with loud background noise, which gives him an impression of hypersensitivity to the right ear. He denies pain to the internal or external ear, jaw pain, fever, chills, or uncontrolled blood pressure. He has not attempted any therapy.      Patient Care Team:  Hollie De APRN as PCP - General (Family Medicine)    REVIEW OF SYSTEMS     Review of Systems   As Per HPI.  All other systems negative.     PHYSICAL EXAMINATION     Physical Exam  Vitals and nursing note reviewed.   Constitutional: " "      General: He is not in acute distress.     Appearance: Normal appearance. He is not ill-appearing.   HENT:      Head: Normocephalic and atraumatic.      Right Ear: Tympanic membrane, ear canal and external ear normal. There is no impacted cerumen.      Left Ear: Tympanic membrane, ear canal and external ear normal. There is no impacted cerumen.   Cardiovascular:      Rate and Rhythm: Normal rate and regular rhythm.      Pulses: Normal pulses.      Heart sounds: Normal heart sounds. No murmur heard.     No friction rub. No gallop.   Pulmonary:      Effort: Pulmonary effort is normal. No respiratory distress.      Breath sounds: Normal breath sounds. No stridor. No wheezing.   Neurological:      Mental Status: He is alert.   Psychiatric:         Mood and Affect: Mood normal.         Behavior: Behavior normal.             REVIEWED DATA     Labs:     Lab Results   Component Value Date     (L) 07/26/2024    K 4.7 07/26/2024    CALCIUM 9.2 07/26/2024    AST 24 07/26/2024    ALT 24 07/26/2024    BUN 18 07/26/2024    CREATININE 0.92 07/26/2024    CREATININE 1.14 01/18/2024    CREATININE 1.14 07/24/2023    EGFRRESULT 97.0 07/26/2024     Lab Results   Component Value Date    HGBA1C 5.10 01/18/2024     Lab Results   Component Value Date     (H) 07/26/2024     (H) 01/18/2024    HDL 53 07/26/2024    HDL 58 01/18/2024    TRIG 59 07/26/2024    TRIG 77 01/18/2024     Lab Results   Component Value Date    TSH 2.800 01/18/2024    FREET4 1.11 01/18/2024     Lab Results   Component Value Date    WBC 3.63 07/26/2024    HGB 15.8 07/26/2024     07/26/2024   No results found for: \"MALBCRERATIO\"          Imaging:               Medical Tests:               Summary of old records / correspondence / consultant report:             Request outside records:       "

## 2024-11-22 DIAGNOSIS — N52.9 ERECTILE DYSFUNCTION, UNSPECIFIED ERECTILE DYSFUNCTION TYPE: ICD-10-CM

## 2024-11-22 RX ORDER — TADALAFIL 10 MG/1
10 TABLET ORAL DAILY PRN
Qty: 30 TABLET | Refills: 5 | Status: SHIPPED | OUTPATIENT
Start: 2024-11-22

## 2024-11-25 ENCOUNTER — OFFICE VISIT (OUTPATIENT)
Dept: INTERNAL MEDICINE | Age: 59
End: 2024-11-25
Payer: COMMERCIAL

## 2024-11-25 VITALS
RESPIRATION RATE: 16 BRPM | WEIGHT: 213 LBS | OXYGEN SATURATION: 97 % | HEART RATE: 82 BPM | SYSTOLIC BLOOD PRESSURE: 122 MMHG | DIASTOLIC BLOOD PRESSURE: 74 MMHG | TEMPERATURE: 97 F | BODY MASS INDEX: 30.49 KG/M2 | HEIGHT: 70 IN

## 2024-11-25 DIAGNOSIS — H69.92 DYSFUNCTION OF LEFT EUSTACHIAN TUBE: Primary | ICD-10-CM

## 2024-11-25 DIAGNOSIS — H65.192 ACUTE EFFUSION OF LEFT EAR: ICD-10-CM

## 2024-11-25 PROCEDURE — 99213 OFFICE O/P EST LOW 20 MIN: CPT

## 2024-11-25 RX ORDER — METHYLPREDNISOLONE 4 MG/1
TABLET ORAL
Qty: 21 TABLET | Refills: 0 | Status: SHIPPED | OUTPATIENT
Start: 2024-11-25

## 2024-11-25 NOTE — PROGRESS NOTES
"    I N T E R N A L  M E D I C I N E  Hollie De APRATILIO    ENCOUNTER DATE:  11/25/2024    Ren De La Cruz / 59 y.o. / male      CHIEF COMPLAINT / REASON FOR OFFICE VISIT     Earache (Both ears first in started in the right ear and now its the left ear giving him trouble .It's going on for 3 days in left ear.)      ASSESSMENT & PLAN     Diagnoses and all orders for this visit:    1. Dysfunction of left eustachian tube (Primary)  -     Ambulatory Referral to ENT (Otolaryngology)    2. Acute effusion of left ear  -     methylPREDNISolone (MEDROL) 4 MG dose pack; Take as directed on package instructions.  Dispense: 21 tablet; Refill: 0         SUMMARY/DISCUSSION  Suspect likely left ear eustachian tube dysfunction give left ear effusion.  Continue Flonase nasal spray.  Reviewed risks, benefits, side effects of steroid dose pack and patient would like to proceed because his left ear symptoms are bothersome.  Reviewed importance of monitoring BP while taking steroids.  Reviewed valsalva maneuver and recommended to perform hourly.  Follow up with ENT referral.  Return for any worsening symptoms.        Next Appointment with me: 1/21/2025    Return for Next scheduled follow up.      VITAL SIGNS     Visit Vitals  /74   Pulse 82   Temp 97 °F (36.1 °C)   Resp 16   Ht 177.8 cm (70\")   Wt 96.6 kg (213 lb)   SpO2 97%   BMI 30.56 kg/m²             Wt Readings from Last 3 Encounters:   11/25/24 96.6 kg (213 lb)   10/31/24 96.6 kg (213 lb)   09/30/24 94.8 kg (209 lb)     Body mass index is 30.56 kg/m².        MEDICATIONS AT THE TIME OF OFFICE VISIT     Current Outpatient Medications on File Prior to Visit   Medication Sig Dispense Refill    fluticasone (FLONASE) 50 MCG/ACT nasal spray Administer 2 sprays into the nostril(s) as directed by provider Daily. 15.8 mL 0    olmesartan (BENICAR) 20 MG tablet Take 1 tablet by mouth Daily. 90 tablet 1    tadalafil (CIALIS) 10 MG tablet TAKE 1 TABLET BY MOUTH DAILY AS NEEDED FOR " ERECTILE DYSFUNCTION. 30 tablet 5    [DISCONTINUED] cetirizine-pseudoephedrine (ZyrTEC-D) 5-120 MG per 12 hr tablet Take 1 tablet by mouth 2 (Two) Times a Day. (Patient not taking: Reported on 11/25/2024) 30 tablet 0    [DISCONTINUED] ciprofloxacin-dexAMETHasone (Ciprodex) 0.3-0.1 % otic suspension Administer 4 drops to the right ear 2 (Two) Times a Day. For 5 days (Patient not taking: Reported on 11/25/2024) 7.5 mL 0     No current facility-administered medications on file prior to visit.        HISTORY OF PRESENT ILLNESS       Here today with left ear discomfort, sound sensitivity, muffled hearing x 3 days.  Previously treated by Ronal BENSON in September/ October 2024 for similar symptoms of right ear.  Treated with Flonase nasal spray with benefit.  Prescribed Zyrtec D but did not start.  Referred to ENT but did not follow up. Does have HTN.  No fever, chills, ear drainage.  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      Patient Care Team:  Hollie De APRN as PCP - General (Family Medicine)    REVIEW OF SYSTEMS     Review of Systems   Constitutional:  Negative for chills, fever and unexpected weight change.   HENT:  Positive for ear pain (Left) and hearing loss (Muffled hearing left ear). Negative for congestion, ear discharge, postnasal drip, sinus pressure and sinus pain.    Respiratory:  Negative for cough, chest tightness and shortness of breath.    Cardiovascular:  Negative for chest pain, palpitations and leg swelling.   Neurological:  Negative for dizziness, weakness, light-headedness and headaches.   Psychiatric/Behavioral:  The patient is not nervous/anxious.           PHYSICAL EXAMINATION     Physical Exam  Vitals reviewed.   Constitutional:       General: He is not in acute distress.     Appearance: Normal appearance. He is not ill-appearing, toxic-appearing or diaphoretic.   HENT:      Head: Normocephalic and atraumatic.      Right Ear: Tympanic membrane, ear canal and external  ear normal. There is no impacted cerumen.      Left Ear: Ear canal and external ear normal. Tympanic membrane is bulging. Tympanic membrane is not erythematous or retracted.   Cardiovascular:      Rate and Rhythm: Normal rate and regular rhythm.      Heart sounds: Normal heart sounds.   Pulmonary:      Effort: Pulmonary effort is normal.      Breath sounds: Normal breath sounds.   Neurological:      Mental Status: He is alert and oriented to person, place, and time. Mental status is at baseline.   Psychiatric:         Mood and Affect: Mood normal.         Behavior: Behavior normal.         Thought Content: Thought content normal.         Judgment: Judgment normal.           REVIEWED DATA     Labs:           Imaging:            Medical Tests:           Summary of old records / correspondence / consultant report:           Request outside records:

## 2025-02-25 RX ORDER — OLMESARTAN MEDOXOMIL 20 MG/1
20 TABLET ORAL DAILY
Qty: 90 TABLET | Refills: 1 | Status: SHIPPED | OUTPATIENT
Start: 2025-02-25

## 2025-03-21 ENCOUNTER — OFFICE VISIT (OUTPATIENT)
Dept: INTERNAL MEDICINE | Age: 60
End: 2025-03-21
Payer: COMMERCIAL

## 2025-03-21 VITALS
RESPIRATION RATE: 16 BRPM | DIASTOLIC BLOOD PRESSURE: 80 MMHG | HEART RATE: 76 BPM | BODY MASS INDEX: 30.26 KG/M2 | SYSTOLIC BLOOD PRESSURE: 138 MMHG | TEMPERATURE: 98 F | OXYGEN SATURATION: 95 % | WEIGHT: 211.4 LBS | HEIGHT: 70 IN

## 2025-03-21 DIAGNOSIS — I10 PRIMARY HYPERTENSION: ICD-10-CM

## 2025-03-21 DIAGNOSIS — E78.00 PURE HYPERCHOLESTEROLEMIA: ICD-10-CM

## 2025-03-21 DIAGNOSIS — Z12.5 PROSTATE CANCER SCREENING: ICD-10-CM

## 2025-03-21 DIAGNOSIS — Z00.00 ANNUAL PHYSICAL EXAM: Primary | ICD-10-CM

## 2025-03-21 PROCEDURE — 99396 PREV VISIT EST AGE 40-64: CPT

## 2025-03-21 NOTE — PROGRESS NOTES
"    I N T E R N A L  M E D I C I N E  Hollie Santino, APRN      ENCOUNTER DATE:  03/21/2025    Ren Jeanman / 59 y.o. / male    CHIEF COMPLAINT     Annual Exam    HTN: BP is elevated at today's visit,  138/80, on olmesartan 20 mg daily.  Feels stressed at today's visit.  BP at home is averaging 130/80 but checking only occasionally.  Weight is down 4 pounds since February 2024.        HLD: July 2024 lipid panel with elevated ; normal triglycerides 59.  Not taking a statin.   No family history of CAD.       Cologuard in February 2023 was negative.  Next due in 2026.     January 2024 PSA 1.340.    VITALS     Visit Vitals  /80   Pulse 76   Temp 98 °F (36.7 °C)   Resp 16   Ht 177.8 cm (70\")   Wt 95.9 kg (211 lb 6.4 oz)   SpO2 95%   BMI 30.33 kg/m²       BP Readings from Last 3 Encounters:   03/21/25 138/80   11/25/24 122/74   10/31/24 130/89     Wt Readings from Last 3 Encounters:   03/21/25 95.9 kg (211 lb 6.4 oz)   11/25/24 96.6 kg (213 lb)   10/31/24 96.6 kg (213 lb)      Body mass index is 30.33 kg/m².      MEDICATIONS     Current Outpatient Medications on File Prior to Visit   Medication Sig Dispense Refill    olmesartan (BENICAR) 20 MG tablet TAKE 1 TABLET DAILY 90 tablet 1    tadalafil (CIALIS) 10 MG tablet TAKE 1 TABLET BY MOUTH DAILY AS NEEDED FOR ERECTILE DYSFUNCTION. (Patient not taking: Reported on 3/21/2025) 30 tablet 5    [DISCONTINUED] fluticasone (FLONASE) 50 MCG/ACT nasal spray Administer 2 sprays into the nostril(s) as directed by provider Daily. (Patient not taking: Reported on 3/21/2025) 15.8 mL 0    [DISCONTINUED] methylPREDNISolone (MEDROL) 4 MG dose pack Take as directed on package instructions. (Patient not taking: Reported on 3/21/2025) 21 tablet 0     No current facility-administered medications on file prior to visit.         HISTORY OF PRESENT ILLNESS      Ren presents for annual health maintenance visit.    General health: good  Lifestyle:  Attempting to lose weight?: " Yes   Diet: eating healthier, limiting portion sizes  Exercise: exercises 3 days weekly, walking 3x weekly and plans to resume cycling  Tobacco: Never used   Alcohol: occasional/infrequent  Work: Full-time  Reproductive health:  Sexually active?: Yes   Concern for STD?: No   Sexual problems?: erectile dysfunction   Sees Urologist?: No   Depression Screenin/18/2024    10:40 AM   PHQ-2/PHQ-9 Depression Screening   Retired Little Interest or Pleasure in Doing Things 0-->not at all    Retired Feeling Down, Depressed or Hopeless 0-->not at all    Retired PHQ-9: Brief Depression Severity Measure Score 0        Data saved with a previous flowsheet row definition         PHQ-2: 0 (Not depressed)     PHQ-9: 0 (Negative screening for depression)    Patient Care Team:  Hollie De APRN as PCP - General (Family Medicine)  ______________________________________________________________________    ALLERGIES  Allergies   Allergen Reactions    Morphine And Codeine Nausea And Vomiting        PFSH:     The following portions of the patient's history were reviewed and updated as appropriate: Allergies / Current Medications / Past Medical History / Surgical History / Social History / Family History    PROBLEM LIST   Patient Active Problem List   Diagnosis    Hypertensive disorder    Other male erectile dysfunction    Pure hypercholesterolemia       PAST MEDICAL HISTORY  Past Medical History:   Diagnosis Date    Hypertension        SURGICAL HISTORY  Past Surgical History:   Procedure Laterality Date    HIP SURGERY Bilateral     JOINT REPLACEMENT  2011    Both Hips    TONSILLECTOMY  1970       SOCIAL HISTORY  Social History     Socioeconomic History    Marital status: Single   Tobacco Use    Smoking status: Never    Smokeless tobacco: Never   Vaping Use    Vaping status: Never Used   Substance and Sexual Activity    Alcohol use: Yes     Comment: daily, 1-2 drinks    Drug use: Never    Sexual activity: Yes      Partners: Female       FAMILY HISTORY  Family History   Problem Relation Age of Onset    Cancer Mother         breast    Cancer Father         lung    Diabetes Father     Hypertension Father     Multiple sclerosis Sister        IMMUNIZATION HISTORY  Immunization History   Administered Date(s) Administered    COVID-19 (MODERNA) 1st,2nd,3rd Dose Monovalent 03/29/2021, 04/26/2021    COVID-19 (MODERNA) Monovalent Original Booster 12/08/2021    Fluzone (or Fluarix & Flulaval for VFC) >6mos 09/01/2019    Influenza Seasonal Injectable 10/01/2018    Influenza, Unspecified 10/01/2018, 10/01/2023, 09/27/2024    Shingrix 08/12/2020, 02/16/2021    Tdap 08/01/2019         REVIEW OF SYSTEMS     Review of Systems   Constitutional:  Negative for chills, fever and unexpected weight change.   Respiratory:  Negative for cough, chest tightness and shortness of breath.    Cardiovascular:  Negative for chest pain, palpitations and leg swelling.   Neurological:  Negative for dizziness, weakness, light-headedness and headaches.   Psychiatric/Behavioral:  The patient is not nervous/anxious.        PHYSICAL EXAMINATION     Physical Exam  Vitals reviewed.   Constitutional:       General: He is not in acute distress.     Appearance: Normal appearance. He is not ill-appearing, toxic-appearing or diaphoretic.   HENT:      Head: Normocephalic and atraumatic.      Right Ear: Tympanic membrane, ear canal and external ear normal. There is no impacted cerumen.      Left Ear: Tympanic membrane, ear canal and external ear normal. There is no impacted cerumen.      Nose: Nose normal. No congestion or rhinorrhea.      Mouth/Throat:      Mouth: Mucous membranes are moist.      Pharynx: Oropharynx is clear. No oropharyngeal exudate or posterior oropharyngeal erythema.   Eyes:      Extraocular Movements: Extraocular movements intact.      Conjunctiva/sclera: Conjunctivae normal.      Pupils: Pupils are equal, round, and reactive to light.   Cardiovascular:  "     Rate and Rhythm: Normal rate and regular rhythm.      Heart sounds: Normal heart sounds.   Pulmonary:      Effort: Pulmonary effort is normal. No respiratory distress.      Breath sounds: Normal breath sounds.   Abdominal:      General: Bowel sounds are normal.      Palpations: Abdomen is soft.      Tenderness: There is no abdominal tenderness.   Musculoskeletal:         General: Normal range of motion.      Cervical back: Normal range of motion and neck supple.      Right lower leg: No edema.      Left lower leg: No edema.   Lymphadenopathy:      Cervical: No cervical adenopathy.   Skin:     General: Skin is warm and dry.   Neurological:      General: No focal deficit present.      Mental Status: He is alert and oriented to person, place, and time. Mental status is at baseline.   Psychiatric:         Mood and Affect: Mood normal.         Behavior: Behavior normal.         Thought Content: Thought content normal.         Judgment: Judgment normal.         REVIEWED DATA      Labs:    Lab Results   Component Value Date     (L) 07/26/2024    K 4.7 07/26/2024    CALCIUM 9.2 07/26/2024    AST 24 07/26/2024    ALT 24 07/26/2024    BUN 18 07/26/2024    CREATININE 0.92 07/26/2024    CREATININE 1.14 01/18/2024    CREATININE 1.14 07/24/2023       Lab Results   Component Value Date    GLUCOSE 94 07/26/2024    HGBA1C 5.10 01/18/2024    TSH 2.800 01/18/2024    FREET4 1.11 01/18/2024       Lab Results   Component Value Date    PSA 1.340 01/18/2024       [unfilled]    Lab Results   Component Value Date     (H) 07/26/2024    HDL 53 07/26/2024    TRIG 59 07/26/2024    CHOLHDLRATIO 3.23 07/26/2024       No components found for: \"FIHQ418T\"    Lab Results   Component Value Date    WBC 3.63 07/26/2024    HGB 15.8 07/26/2024    .9 (H) 07/26/2024     07/26/2024       Lab Results   Component Value Date    PROTEIN Negative 01/18/2024    GLUCOSEU Negative 01/18/2024    BLOODU Negative 01/18/2024    NITRITEU " Negative 01/18/2024    LEUKOCYTESUR Negative 01/18/2024          Lab Results   Component Value Date    HEPCVIRUSABY Non Reactive 07/24/2023       Imaging:           Medical Tests:           ASSESSMENT & PLAN     ANNUAL WELLNESS EXAM / PHYSICAL     Diagnoses and all orders for this visit:    1. Annual physical exam (Primary)  -     CBC & Differential  -     Comprehensive Metabolic Panel  -     Hemoglobin A1c  -     Lipid Panel With / Chol / HDL Ratio  -     TSH+Free T4  -     Urinalysis without microscopic (no culture) - Urine, Clean Catch    2. Primary hypertension  Overview:  Continue olmesartan 20 mg daily.      3. Pure hypercholesterolemia    4. Prostate cancer screening  -     PSA Screen         Summary/Discussion:     Encouraged to monitor BP at home and to reach out for consistently elevated BP readings > 130/80.  Encouraged low fat diet, regular exercise.    BMI is >= 30 and <35. (Class 1 Obesity). The following options were offered after discussion;: exercise counseling/recommendations and nutrition counseling/recommendations      Next Appointment with me: Visit date not found    Return for 6 month chronic care, 1 year annual physical.      HEALTHCARE MAINTENANCE ISSUES       Cancer Screening:  Colon: Initial/Next screening at age: CURRENT and -Cologuard  Repeat colon cancer screening: every 3 years  Prostate: Performed today and recommended annual screening  Testicular: Recommended monthly self exam  Skin: Monthly self skin examination, annual exam by health professional  Lung: Does not meet criteria for lung cancer screening.   Other:    Screening Labs & Tests:  Lab results reviewed & discussed with with patient or orders placed today.  EKG:  CV Screening: Lipid panel  DEXA (75+ or risk factors):   HEP C (If born [id number] or risk factors): Previously had negative screen  Other:     Immunization/Vaccinations (to be given today unless deferred by patient)  Influenza: Recommended annual influenza  vaccine  Hepatitis A: Verify immunization records  Hepatitis B: Verify immunization records  Tetanus/Pertussis: Up to date  Pneumovax/PCV: Recommended here or at pharmacy  Shingles: Up to date  COVID: Does not plan to get the latest booster  Lifestyle Counseling:  Lifestyle Modifications: Attempt to lose weight, Increase intensity/regularity of aerobic exercise, Follow a low fat, low cholesterol diet, Maintain low sodium diet (< 3 gm) for blood pressure, and Reduce exposure to stress if possible  Safety Issues: Always wear seatbelt, Avoid texting while driving   Use sunscreen, regular skin examination  Recommended annual dental/vision examination.  Emotional/Stress/Sleep: Reviewed and  given when appropriate      Health Maintenance   Topic Date Due    BMI FOLLOWUP  01/18/2025    COVID-19 Vaccine (4 - 2024-25 season) 09/17/2025 (Originally 9/1/2024)    Pneumococcal Vaccine 50+ (1 of 1 - PCV) 09/17/2025 (Originally 8/2/2016)    LIPID PANEL  07/26/2025    COLORECTAL CANCER SCREENING  02/06/2026    ANNUAL PHYSICAL  03/21/2026    TDAP/TD VACCINES (2 - Td or Tdap) 08/01/2029    HEPATITIS C SCREENING  Completed    INFLUENZA VACCINE  Completed    ZOSTER VACCINE  Completed

## 2025-03-28 DIAGNOSIS — I10 PRIMARY HYPERTENSION: Primary | ICD-10-CM

## 2025-03-28 DIAGNOSIS — E87.1 HYPONATREMIA: ICD-10-CM

## 2025-03-28 DIAGNOSIS — R17 ELEVATED BILIRUBIN: ICD-10-CM

## 2025-03-28 DIAGNOSIS — D75.89 MACROCYTOSIS: ICD-10-CM

## 2025-03-28 LAB
ALBUMIN SERPL-MCNC: 4.7 G/DL (ref 3.5–5.2)
ALBUMIN/GLOB SERPL: 2.2 G/DL
ALP SERPL-CCNC: 69 U/L (ref 39–117)
ALT SERPL-CCNC: 31 U/L (ref 1–41)
APPEARANCE UR: CLEAR
AST SERPL-CCNC: 34 U/L (ref 1–40)
BASOPHILS # BLD AUTO: 0.02 10*3/MM3 (ref 0–0.2)
BASOPHILS NFR BLD AUTO: 0.5 % (ref 0–1.5)
BILIRUB SERPL-MCNC: 1.5 MG/DL (ref 0–1.2)
BILIRUB UR QL STRIP: NEGATIVE
BUN SERPL-MCNC: 15 MG/DL (ref 6–20)
BUN/CREAT SERPL: 13.5 (ref 7–25)
CALCIUM SERPL-MCNC: 9.5 MG/DL (ref 8.6–10.5)
CHLORIDE SERPL-SCNC: 95 MMOL/L (ref 98–107)
CHOLEST SERPL-MCNC: 191 MG/DL (ref 0–200)
CHOLEST/HDLC SERPL: 3.24 {RATIO}
CO2 SERPL-SCNC: 27.4 MMOL/L (ref 22–29)
COLOR UR: YELLOW
CREAT SERPL-MCNC: 1.11 MG/DL (ref 0.76–1.27)
EGFRCR SERPLBLD CKD-EPI 2021: 77 ML/MIN/1.73
EOSINOPHIL # BLD AUTO: 0.07 10*3/MM3 (ref 0–0.4)
EOSINOPHIL NFR BLD AUTO: 1.9 % (ref 0.3–6.2)
ERYTHROCYTE [DISTWIDTH] IN BLOOD BY AUTOMATED COUNT: 12.9 % (ref 12.3–15.4)
GLOBULIN SER CALC-MCNC: 2.1 GM/DL
GLUCOSE SERPL-MCNC: 198 MG/DL (ref 65–99)
GLUCOSE UR QL STRIP: NEGATIVE
HBA1C MFR BLD: 5 % (ref 4.8–5.6)
HCT VFR BLD AUTO: 46.1 % (ref 37.5–51)
HDLC SERPL-MCNC: 59 MG/DL (ref 40–60)
HGB BLD-MCNC: 16 G/DL (ref 13–17.7)
HGB UR QL STRIP: NEGATIVE
IMM GRANULOCYTES # BLD AUTO: 0.01 10*3/MM3 (ref 0–0.05)
IMM GRANULOCYTES NFR BLD AUTO: 0.3 % (ref 0–0.5)
KETONES UR QL STRIP: ABNORMAL
LDLC SERPL CALC-MCNC: 114 MG/DL (ref 0–100)
LEUKOCYTE ESTERASE UR QL STRIP: NEGATIVE
LYMPHOCYTES # BLD AUTO: 0.64 10*3/MM3 (ref 0.7–3.1)
LYMPHOCYTES NFR BLD AUTO: 17.4 % (ref 19.6–45.3)
MCH RBC QN AUTO: 36 PG (ref 26.6–33)
MCHC RBC AUTO-ENTMCNC: 34.7 G/DL (ref 31.5–35.7)
MCV RBC AUTO: 103.8 FL (ref 79–97)
MONOCYTES # BLD AUTO: 0.43 10*3/MM3 (ref 0.1–0.9)
MONOCYTES NFR BLD AUTO: 11.7 % (ref 5–12)
NEUTROPHILS # BLD AUTO: 2.51 10*3/MM3 (ref 1.7–7)
NEUTROPHILS NFR BLD AUTO: 68.2 % (ref 42.7–76)
NITRITE UR QL STRIP: NEGATIVE
NRBC BLD AUTO-RTO: 0 /100 WBC (ref 0–0.2)
PH UR STRIP: 7 [PH] (ref 5–8)
PLATELET # BLD AUTO: 192 10*3/MM3 (ref 140–450)
POTASSIUM SERPL-SCNC: 4.7 MMOL/L (ref 3.5–5.2)
PROT SERPL-MCNC: 6.8 G/DL (ref 6–8.5)
PROT UR QL STRIP: NEGATIVE
PSA SERPL-MCNC: 1.51 NG/ML (ref 0–4)
RBC # BLD AUTO: 4.44 10*6/MM3 (ref 4.14–5.8)
SODIUM SERPL-SCNC: 133 MMOL/L (ref 136–145)
SP GR UR STRIP: 1.02 (ref 1–1.03)
T4 FREE SERPL-MCNC: 1.28 NG/DL (ref 0.92–1.68)
TRIGL SERPL-MCNC: 98 MG/DL (ref 0–150)
TSH SERPL DL<=0.005 MIU/L-ACNC: 1.79 UIU/ML (ref 0.27–4.2)
UROBILINOGEN UR STRIP-MCNC: ABNORMAL MG/DL
VLDLC SERPL CALC-MCNC: 18 MG/DL (ref 5–40)
WBC # BLD AUTO: 3.68 10*3/MM3 (ref 3.4–10.8)

## 2025-03-28 RX ORDER — HYDROCHLOROTHIAZIDE 12.5 MG/1
12.5 TABLET ORAL DAILY
Qty: 30 TABLET | Refills: 0 | Status: SHIPPED | OUTPATIENT
Start: 2025-03-28

## 2025-03-28 RX ORDER — AMLODIPINE BESYLATE 5 MG/1
5 TABLET ORAL DAILY
Qty: 30 TABLET | Refills: 0 | Status: SHIPPED | OUTPATIENT
Start: 2025-03-28 | End: 2025-03-28 | Stop reason: SINTOL

## 2025-05-01 DIAGNOSIS — I10 PRIMARY HYPERTENSION: ICD-10-CM

## 2025-05-01 RX ORDER — HYDROCHLOROTHIAZIDE 12.5 MG/1
12.5 TABLET ORAL DAILY
Qty: 30 TABLET | Refills: 0 | OUTPATIENT
Start: 2025-05-01

## 2025-05-01 RX ORDER — HYDROCHLOROTHIAZIDE 12.5 MG/1
12.5 TABLET ORAL DAILY
Qty: 30 TABLET | Refills: 0 | Status: SHIPPED | OUTPATIENT
Start: 2025-05-01

## 2025-05-01 NOTE — TELEPHONE ENCOUNTER
LVM for patient to call back. Patient needs to schedule a lab only appointment prior to prescription refill.

## 2025-05-02 DIAGNOSIS — N52.9 ERECTILE DYSFUNCTION, UNSPECIFIED ERECTILE DYSFUNCTION TYPE: ICD-10-CM

## 2025-05-02 RX ORDER — TADALAFIL 10 MG/1
10 TABLET ORAL DAILY PRN
Qty: 30 TABLET | Refills: 5 | Status: SHIPPED | OUTPATIENT
Start: 2025-05-02

## 2025-05-07 ENCOUNTER — TELEPHONE (OUTPATIENT)
Dept: INTERNAL MEDICINE | Age: 60
End: 2025-05-07
Payer: COMMERCIAL

## 2025-05-07 NOTE — TELEPHONE ENCOUNTER
Spoke with patient. They know they need to come in for labs. They declined to make an appointment with the office and instead will walk in to the lab downstairs.

## 2025-05-18 DIAGNOSIS — I10 PRIMARY HYPERTENSION: ICD-10-CM

## 2025-05-19 DIAGNOSIS — N52.9 ERECTILE DYSFUNCTION, UNSPECIFIED ERECTILE DYSFUNCTION TYPE: ICD-10-CM

## 2025-05-19 RX ORDER — HYDROCHLOROTHIAZIDE 12.5 MG/1
12.5 TABLET ORAL DAILY
Qty: 30 TABLET | Refills: 0 | Status: SHIPPED | OUTPATIENT
Start: 2025-05-19 | End: 2025-05-23

## 2025-05-19 RX ORDER — TADALAFIL 10 MG/1
10 TABLET ORAL DAILY PRN
Qty: 30 TABLET | Refills: 5 | Status: SHIPPED | OUTPATIENT
Start: 2025-05-19

## 2025-05-23 ENCOUNTER — OFFICE VISIT (OUTPATIENT)
Dept: INTERNAL MEDICINE | Age: 60
End: 2025-05-23
Payer: COMMERCIAL

## 2025-05-23 ENCOUNTER — PRIOR AUTHORIZATION (OUTPATIENT)
Dept: INTERNAL MEDICINE | Age: 60
End: 2025-05-23

## 2025-05-23 VITALS
OXYGEN SATURATION: 97 % | RESPIRATION RATE: 16 BRPM | HEART RATE: 85 BPM | HEIGHT: 70 IN | TEMPERATURE: 97.8 F | DIASTOLIC BLOOD PRESSURE: 86 MMHG | WEIGHT: 205 LBS | SYSTOLIC BLOOD PRESSURE: 118 MMHG | BODY MASS INDEX: 29.35 KG/M2

## 2025-05-23 DIAGNOSIS — E87.1 HYPONATREMIA: ICD-10-CM

## 2025-05-23 DIAGNOSIS — I10 PRIMARY HYPERTENSION: Primary | ICD-10-CM

## 2025-05-23 PROCEDURE — 99214 OFFICE O/P EST MOD 30 MIN: CPT

## 2025-05-23 RX ORDER — AMLODIPINE BESYLATE 5 MG/1
5 TABLET ORAL DAILY
Qty: 90 TABLET | Refills: 0 | Status: SHIPPED | OUTPATIENT
Start: 2025-05-23

## 2025-05-23 NOTE — PROGRESS NOTES
"    I N T E R N A L  M E D I C I N E  Hollie De, CLARK    ENCOUNTER DATE:  05/23/2025    Ren De La Cruz / 59 y.o. / male      CHIEF COMPLAINT / REASON FOR OFFICE VISIT     Hypertension      ASSESSMENT & PLAN     Diagnoses and all orders for this visit:    1. Primary hypertension (Primary)  -     amLODIPine (NORVASC) 5 MG tablet; Take 1 tablet by mouth Daily.  Dispense: 90 tablet; Refill: 0  -     Basic metabolic panel; Future    2. Hyponatremia  -     Basic metabolic panel; Future         SUMMARY/DISCUSSION  STOP HCTZ due to continued hyponatremia.  Start low dose amlodipine 5 mg daily.  Monitor BP readings at home and send me readings for review via MyChart in 1-2 weeks.  Goal BP is < 130/80.  Recheck BMP in 1 month.      Next Appointment with me: 9/22/2025    Return for 1 month lab only appt, then follow up scheduled.      VITAL SIGNS     Visit Vitals  /86   Pulse 85   Temp 97.8 °F (36.6 °C)   Resp 16   Ht 177.8 cm (70\")   Wt 93 kg (205 lb)   SpO2 97%   BMI 29.41 kg/m²             Wt Readings from Last 3 Encounters:   05/23/25 93 kg (205 lb)   03/21/25 95.9 kg (211 lb 6.4 oz)   11/25/24 96.6 kg (213 lb)     Body mass index is 29.41 kg/m².        MEDICATIONS AT THE TIME OF OFFICE VISIT     Current Outpatient Medications on File Prior to Visit   Medication Sig Dispense Refill    tadalafil (CIALIS) 10 MG tablet Take 1 tablet by mouth Daily As Needed for Erectile Dysfunction. 30 tablet 5    [DISCONTINUED] hydroCHLOROthiazide 12.5 MG tablet TAKE 1 TABLET DAILY 30 tablet 0     No current facility-administered medications on file prior to visit.        HISTORY OF PRESENT ILLNESS     HTN: Today's BP is well controlled on HCTZ 12.5 mg.  Recent BMP with continued mildly low sodium, 135.  Avoids alcohol.  Asymptomatic.  Actively working towards weight loss - weight is down 6 pounds since May 2025.  Increased exercise - cycling regularly.  Previously took high dose amlodipine around 2018 and states it made him " "feel \"funny\" but otherwise, does not recall any specific side effects.    Patient Care Team:  Hollie De APRN as PCP - General (Family Medicine)    REVIEW OF SYSTEMS     Review of Systems   Constitutional:  Negative for chills, diaphoresis, fatigue, fever and unexpected weight change.   HENT:  Negative for congestion and sore throat.    Respiratory:  Negative for cough, chest tightness and shortness of breath.    Cardiovascular:  Negative for chest pain, palpitations and leg swelling.   Gastrointestinal:  Negative for abdominal pain, nausea and vomiting.   Genitourinary:  Negative for dysuria.   Musculoskeletal:  Negative for myalgias and neck pain.   Skin:  Negative for rash.   Neurological:  Negative for dizziness, weakness, light-headedness, numbness and headaches.   Psychiatric/Behavioral:  The patient is not nervous/anxious.           PHYSICAL EXAMINATION     Physical Exam  Vitals reviewed.   Constitutional:       General: He is not in acute distress.     Appearance: Normal appearance. He is not ill-appearing, toxic-appearing or diaphoretic.   HENT:      Head: Normocephalic and atraumatic.   Cardiovascular:      Rate and Rhythm: Normal rate and regular rhythm.      Heart sounds: Normal heart sounds.   Pulmonary:      Effort: Pulmonary effort is normal.      Breath sounds: Normal breath sounds.   Musculoskeletal:      Right lower leg: No edema.      Left lower leg: No edema.   Neurological:      Mental Status: He is alert and oriented to person, place, and time. Mental status is at baseline.   Psychiatric:         Mood and Affect: Mood normal.         Behavior: Behavior normal.         Thought Content: Thought content normal.         Judgment: Judgment normal.           REVIEWED DATA     Labs:           Imaging:            Medical Tests:           Summary of old records / correspondence / consultant report:           Request outside records:         "

## 2025-06-13 DIAGNOSIS — I10 PRIMARY HYPERTENSION: ICD-10-CM

## 2025-06-13 RX ORDER — HYDROCHLOROTHIAZIDE 12.5 MG/1
12.5 TABLET ORAL DAILY
Qty: 30 TABLET | Refills: 0 | OUTPATIENT
Start: 2025-06-13

## 2025-06-24 ENCOUNTER — LAB (OUTPATIENT)
Facility: HOSPITAL | Age: 60
End: 2025-06-24
Payer: COMMERCIAL

## 2025-06-24 PROCEDURE — 80048 BASIC METABOLIC PNL TOTAL CA: CPT

## 2025-08-04 DIAGNOSIS — I10 PRIMARY HYPERTENSION: Primary | ICD-10-CM

## 2025-08-04 RX ORDER — AMLODIPINE BESYLATE 5 MG/1
5 TABLET ORAL DAILY
Qty: 90 TABLET | Refills: 0 | Status: SHIPPED | OUTPATIENT
Start: 2025-08-04